# Patient Record
Sex: FEMALE | Race: ASIAN | NOT HISPANIC OR LATINO | URBAN - METROPOLITAN AREA
[De-identification: names, ages, dates, MRNs, and addresses within clinical notes are randomized per-mention and may not be internally consistent; named-entity substitution may affect disease eponyms.]

---
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
## 2023-08-08 ENCOUNTER — OFFICE (OUTPATIENT)
Dept: URBAN - METROPOLITAN AREA CLINIC 109 | Facility: CLINIC | Age: 23
Setting detail: OPHTHALMOLOGY
End: 2023-08-08
Payer: COMMERCIAL

## 2023-08-08 DIAGNOSIS — H10.45: ICD-10-CM

## 2023-08-08 PROBLEM — H52.7 REFRACTIVE ERROR ; BOTH EYES: Status: ACTIVE | Noted: 2023-08-08

## 2023-08-08 PROCEDURE — 92004 COMPRE OPH EXAM NEW PT 1/>: CPT | Performed by: OPHTHALMOLOGY

## 2023-08-08 ASSESSMENT — SPHEQUIV_DERIVED
OD_SPHEQUIV: -4
OD_SPHEQUIV: -3.5
OS_SPHEQUIV: -3.25
OS_SPHEQUIV: -3.375

## 2023-08-08 ASSESSMENT — REFRACTION_AUTOREFRACTION
OS_SPHERE: -3.25
OD_SPHERE: -3.25
OD_CYLINDER: -0.50
OD_AXIS: 004
OS_AXIS: 150
OS_CYLINDER: -0.25

## 2023-08-08 ASSESSMENT — REFRACTION_CURRENTRX
OD_VPRISM_DIRECTION: SV
OS_CYLINDER: -0.50
OS_SPHERE: -3.00
OS_OVR_VA: 20/
OS_VPRISM_DIRECTION: SV
OD_CYLINDER: -0.50
OD_SPHERE: -4.25
OS_AXIS: 170
OD_OVR_VA: 20/
OD_AXIS: 001

## 2023-08-08 ASSESSMENT — CONFRONTATIONAL VISUAL FIELD TEST (CVF)
OD_FINDINGS: FULL
OS_FINDINGS: FULL

## 2023-08-08 ASSESSMENT — VISUAL ACUITY
OD_BCVA: 20/20-1
OS_BCVA: 20/20-1

## 2023-08-08 ASSESSMENT — REFRACTION_MANIFEST
OD_VA1: 20/20
OD_AXIS: 180
OD_CYLINDER: -0.50
OS_AXIS: 175
OS_CYLINDER: -0.50
OS_VA1: 20/20
OD_SPHERE: -3.75
OS_SPHERE: -3.00

## 2023-08-08 ASSESSMENT — TONOMETRY
OS_IOP_MMHG: 19
OD_IOP_MMHG: 19

## 2024-07-17 ENCOUNTER — OFFICE (OUTPATIENT)
Dept: URBAN - METROPOLITAN AREA CLINIC 109 | Facility: CLINIC | Age: 24
Setting detail: OPHTHALMOLOGY
End: 2024-07-17
Payer: COMMERCIAL

## 2024-07-17 DIAGNOSIS — H10.45: ICD-10-CM

## 2024-07-17 DIAGNOSIS — H52.13: ICD-10-CM

## 2024-07-17 DIAGNOSIS — H52.7: ICD-10-CM

## 2024-07-17 PROCEDURE — 92014 COMPRE OPH EXAM EST PT 1/>: CPT | Performed by: OPHTHALMOLOGY

## 2024-07-17 PROCEDURE — 92015 DETERMINE REFRACTIVE STATE: CPT | Performed by: OPHTHALMOLOGY

## 2024-07-17 ASSESSMENT — CONFRONTATIONAL VISUAL FIELD TEST (CVF)
OS_FINDINGS: FULL
OD_FINDINGS: FULL

## 2025-02-05 ENCOUNTER — APPOINTMENT (OUTPATIENT)
Dept: PODIATRY | Facility: CLINIC | Age: 25
End: 2025-02-05

## 2025-02-05 PROBLEM — Z00.00 ENCOUNTER FOR PREVENTIVE HEALTH EXAMINATION: Status: ACTIVE | Noted: 2025-02-05

## 2025-05-14 ENCOUNTER — OFFICE (OUTPATIENT)
Dept: URBAN - METROPOLITAN AREA CLINIC 109 | Facility: CLINIC | Age: 25
Setting detail: OPHTHALMOLOGY
End: 2025-05-14
Payer: COMMERCIAL

## 2025-05-14 DIAGNOSIS — H52.13: ICD-10-CM

## 2025-05-14 PROCEDURE — CLEST CL LENS FIT ESTABLISHED WEARER FITTING ONLY: Performed by: OPTOMETRIST

## 2025-05-14 PROCEDURE — 92015 DETERMINE REFRACTIVE STATE: CPT | Performed by: OPTOMETRIST

## 2025-05-14 PROCEDURE — 92014 COMPRE OPH EXAM EST PT 1/>: CPT | Performed by: OPTOMETRIST

## 2025-05-14 ASSESSMENT — REFRACTION_AUTOREFRACTION
OD_CYLINDER: -0.50
OS_CYLINDER: -0.75
OD_SPHERE: -3.25
OS_AXIS: 169
OS_SPHERE: -2.25
OD_AXIS: 177

## 2025-05-14 ASSESSMENT — REFRACTION_MANIFEST
OD_AXIS: 180
OS_VA1: 20/20
OS_AXIS: 180
OS_CYLINDER: -0.50
OS_AXIS: 175
OD_SPHERE: -3.00
OD_CYLINDER: -0.50
OD_VA1: 20/20
OS_VA1: 20/20
OS_SPHERE: -2.75
OD_CYLINDER: -0.50
OS_SPHERE: -3.00
OD_SPHERE: -3.50
OS_CYLINDER: -0.50
OD_VA1: 20/20
OD_AXIS: 180

## 2025-05-14 ASSESSMENT — KERATOMETRY
OS_K1POWER_DIOPTERS: 44.00
OS_K2POWER_DIOPTERS: 45.25
OD_K1POWER_DIOPTERS: 43.75
OD_AXISANGLE_DEGREES: 096
OD_K2POWER_DIOPTERS: 44.75
OS_AXISANGLE_DEGREES: 085

## 2025-05-14 ASSESSMENT — REFRACTION_CURRENTRX
OD_AXIS: 001
OS_SPHERE: -3.00
OD_CYLINDER: -0.50
OS_VPRISM_DIRECTION: SV
OD_VPRISM_DIRECTION: SV
OS_AXIS: 170
OS_OVR_VA: 20/
OD_SPHERE: -4.25
OD_OVR_VA: 20/
OS_CYLINDER: -0.50

## 2025-05-14 ASSESSMENT — TONOMETRY
OD_IOP_MMHG: 18
OS_IOP_MMHG: 18

## 2025-05-14 ASSESSMENT — CONFRONTATIONAL VISUAL FIELD TEST (CVF)
OS_FINDINGS: FULL
OD_FINDINGS: FULL

## 2025-05-14 ASSESSMENT — VISUAL ACUITY
OD_BCVA: 20/20-1
OS_BCVA: 20/20+1